# Patient Record
Sex: MALE | Race: WHITE | NOT HISPANIC OR LATINO | Employment: FULL TIME
[De-identification: names, ages, dates, MRNs, and addresses within clinical notes are randomized per-mention and may not be internally consistent; named-entity substitution may affect disease eponyms.]

---

## 2017-02-02 ENCOUNTER — RECORDS - HEALTHEAST (OUTPATIENT)
Dept: ADMINISTRATIVE | Facility: OTHER | Age: 32
End: 2017-02-02

## 2017-03-02 ENCOUNTER — RECORDS - HEALTHEAST (OUTPATIENT)
Dept: ADMINISTRATIVE | Facility: OTHER | Age: 32
End: 2017-03-02

## 2017-04-20 ENCOUNTER — RECORDS - HEALTHEAST (OUTPATIENT)
Dept: ADMINISTRATIVE | Facility: OTHER | Age: 32
End: 2017-04-20

## 2017-04-25 ENCOUNTER — RECORDS - HEALTHEAST (OUTPATIENT)
Dept: ADMINISTRATIVE | Facility: OTHER | Age: 32
End: 2017-04-25

## 2017-05-05 ENCOUNTER — RECORDS - HEALTHEAST (OUTPATIENT)
Dept: ADMINISTRATIVE | Facility: OTHER | Age: 32
End: 2017-05-05

## 2017-05-13 ENCOUNTER — RECORDS - HEALTHEAST (OUTPATIENT)
Dept: ADMINISTRATIVE | Facility: OTHER | Age: 32
End: 2017-05-13

## 2017-05-30 ENCOUNTER — RECORDS - HEALTHEAST (OUTPATIENT)
Dept: ADMINISTRATIVE | Facility: OTHER | Age: 32
End: 2017-05-30

## 2017-06-13 ENCOUNTER — RECORDS - HEALTHEAST (OUTPATIENT)
Dept: ADMINISTRATIVE | Facility: OTHER | Age: 32
End: 2017-06-13

## 2017-10-16 ENCOUNTER — RECORDS - HEALTHEAST (OUTPATIENT)
Dept: ADMINISTRATIVE | Facility: OTHER | Age: 32
End: 2017-10-16

## 2017-12-21 ENCOUNTER — RECORDS - HEALTHEAST (OUTPATIENT)
Dept: ADMINISTRATIVE | Facility: OTHER | Age: 32
End: 2017-12-21

## 2018-10-18 ENCOUNTER — RECORDS - HEALTHEAST (OUTPATIENT)
Dept: ADMINISTRATIVE | Facility: OTHER | Age: 33
End: 2018-10-18

## 2018-10-19 ENCOUNTER — OFFICE VISIT - HEALTHEAST (OUTPATIENT)
Dept: FAMILY MEDICINE | Facility: CLINIC | Age: 33
End: 2018-10-19

## 2018-10-19 ENCOUNTER — COMMUNICATION - HEALTHEAST (OUTPATIENT)
Dept: TELEHEALTH | Facility: CLINIC | Age: 33
End: 2018-10-19

## 2018-10-19 DIAGNOSIS — Z00.00 WELL ADULT EXAM: ICD-10-CM

## 2018-10-19 DIAGNOSIS — K21.9 GERD (GASTROESOPHAGEAL REFLUX DISEASE): ICD-10-CM

## 2018-10-19 DIAGNOSIS — F41.8 DEPRESSION WITH ANXIETY: ICD-10-CM

## 2018-10-19 LAB — TSH SERPL DL<=0.005 MIU/L-ACNC: 1.57 UIU/ML (ref 0.3–5)

## 2018-10-19 RX ORDER — OMEGA-3S/DHA/EPA/FISH OIL/D3 300MG-1000
400 CAPSULE ORAL DAILY
Status: SHIPPED | COMMUNITY
Start: 2018-10-19

## 2018-10-19 RX ORDER — OMEPRAZOLE 10 MG/1
20 CAPSULE, DELAYED RELEASE ORAL
Qty: 60 CAPSULE | Refills: 11 | Status: SHIPPED | OUTPATIENT
Start: 2018-10-19

## 2018-10-19 ASSESSMENT — MIFFLIN-ST. JEOR: SCORE: 1627.68

## 2018-10-22 ENCOUNTER — COMMUNICATION - HEALTHEAST (OUTPATIENT)
Dept: FAMILY MEDICINE | Facility: CLINIC | Age: 33
End: 2018-10-22

## 2021-06-02 VITALS — BODY MASS INDEX: 23.79 KG/M2 | HEIGHT: 68 IN | WEIGHT: 157 LBS

## 2021-06-16 PROBLEM — A04.72 C. DIFFICILE COLITIS: Status: ACTIVE | Noted: 2018-10-23

## 2021-06-16 PROBLEM — F41.8 DEPRESSION WITH ANXIETY: Status: ACTIVE | Noted: 2018-10-19

## 2021-06-16 PROBLEM — K21.9 GERD (GASTROESOPHAGEAL REFLUX DISEASE): Status: ACTIVE | Noted: 2018-10-19

## 2021-06-16 PROBLEM — K58.9 IBS (IRRITABLE BOWEL SYNDROME): Status: ACTIVE | Noted: 2018-10-23

## 2021-06-16 PROBLEM — R76.8 HELICOBACTER PYLORI AB+: Status: ACTIVE | Noted: 2018-10-23

## 2021-06-21 NOTE — PROGRESS NOTES
SUBJECTIVE:  Stephane Holden is a 33 y.o. male who is here for an annual physical.  he is seen as a new patient in our office and for physical.  He has a history of GERD and takes omeprazole, 20 mg daily, has done this for a year and a half daily and prior to that took it occasionally.  In 2014 he had an endoscopy and colonoscopy because of epigastric pain, acid reflux, irritable bowel symptoms.  Some inflammation was seen but his H. pylori and biopsies were negative and an endoscopy and colonoscopy.  He was advised to stay on omeprazole.  He tried Zantac and this made his stomach cramp.  Been tested for food allergies and is on a strict diet, eats plenty of fruits and vegetables and drinks plenty of water, maintained his weight well without weight loss.  Is on a gluten-free diet for gluten sensitivity.  He notes some wrist pain since his last job where he was on the computer continually and did phone answer.  He is currently off work, moved to this area from [place].  Lives with his fiancée.  Has had a history of anxiety and depression, denies suicidal or homicidal ideations, states he usually can control his symptoms well.  He would like to seek some cognitive behavioral therapy to help with situational causes for this and to deal with family of origin issues.  ROS: Patient denies fever, chills, sweats, fainting, fatigue, weight change, dizziness, sleep problems, chest pain, palpitations, shortness of breath, wheezing, cough,  sore throat, changes in hearing, ear pain,tinnitus,  disphagia, sore throat, changes in vision, eye pain eye redness, acid reflux, nausea, vomiting, constipation, black or bloody stools,  Dysuria, frequency, urinary incontinence, nocturia, hematuria, back pain, bone pain, muscle cramps,edema, weakness, numbness, tingling of extremities, rash, itching, skin changes, swollen lymph nodes, thirst, increased urination, breast lumps, breast pain, nipple discharge, memory difficulties, mood  "swings, (female)vaginal discharge, dyspareunia, menorrhagia, pelvic pain, sexual dysfunction,   (male) testicular lumps, erectile dysfunction.  No past medical history on file.  No current outpatient prescriptions on file prior to visit.     No current facility-administered medications on file prior to visit.      Social History     Social History     Marital status: Single     Spouse name: N/A     Number of children: N/A     Years of education: N/A     Occupational History     Not on file.     Social History Main Topics     Smoking status: Never Smoker     Smokeless tobacco: Never Used     Alcohol use Yes     Drug use: No     Sexual activity: Not on file     Other Topics Concern     Not on file     Social History Narrative     No narrative on file     Social History     Social History Narrative     No narrative on file     No past surgical history on file.  No family history on file.  /70  Pulse 60  Ht 5' 7.75\" (1.721 m)  Wt 157 lb (71.2 kg)  BMI 24.05 kg/m2  Objective:  General: alert, oriented and in no distress.  HEENT: Sclera white, PERRLA, EOMFI nares patent, MMM with no oral erythema or edema, TYMPANIC MEMBRANE's pearly bilaterally.EACs without erythema or edema.  Heart: Heart has a regular rate and rhythm, no murmurs, clicks or rubs.  Neck: neck supple with no adenopathy, no carotid bruits, thyroid is non-nodular and without organomegaly.  Lungs: Respirations are not labored. Equal chest rise, clear to auscultation, without rales, rhonchi or wheezes.  Abdomen: BS in 4 quadrants, no tenderness to light or deep palpation, liver and spleen are not palpably enlarged and there are no masses noted.   Vascular: 2/4 all 4 extremities; no edema, erythema or tenderness in lower extremities.  Neuro: CN 2-12 intact, DTR 2/4 all extremities, M/S equal all extremities.  Back: Normal curvatures, no tenderness of spinous processes or musculature with palpation of cervical, thoracic or lumbar back. No CVA " tenderness bilaterally.  Nails: fingernails and toenails are pink, without thickened or yellow keratin.  Mood: mildly anxious, good eye contact, affect full, no pressured speech, no suicidal ideations. Worries over health concerns.  Assessment and Plan:  1. Well adult exam     2. GERD (gastroesophageal reflux disease)     3. Depression with anxiety  Ambulatory referral to Psychology    Thyroid [place]     Advised health diet, 6-8 fruit and vegetables daily and discussed juicing in am with protein powder for breakfast.  Advised regular exercise, discussed regular aerobic exercise and strength training.  Water: 6-8 glasses daily.      --   Esthela Matthews, MS, PA-C

## 2021-06-26 ENCOUNTER — HEALTH MAINTENANCE LETTER (OUTPATIENT)
Age: 36
End: 2021-06-26

## 2021-10-16 ENCOUNTER — HEALTH MAINTENANCE LETTER (OUTPATIENT)
Age: 36
End: 2021-10-16

## 2021-12-17 ENCOUNTER — IMMUNIZATION (OUTPATIENT)
Dept: NURSING | Facility: CLINIC | Age: 36
End: 2021-12-17
Payer: COMMERCIAL

## 2021-12-17 PROCEDURE — 91306 COVID-19,PF,MODERNA (18+ YRS BOOSTER .25ML): CPT

## 2021-12-17 PROCEDURE — 0064A COVID-19,PF,MODERNA (18+ YRS BOOSTER .25ML): CPT

## 2022-07-17 ENCOUNTER — HEALTH MAINTENANCE LETTER (OUTPATIENT)
Age: 37
End: 2022-07-17

## 2022-09-25 ENCOUNTER — HEALTH MAINTENANCE LETTER (OUTPATIENT)
Age: 37
End: 2022-09-25

## 2022-10-18 ENCOUNTER — IMMUNIZATION (OUTPATIENT)
Dept: FAMILY MEDICINE | Facility: CLINIC | Age: 37
End: 2022-10-18
Payer: COMMERCIAL

## 2022-10-18 DIAGNOSIS — Z23 NEED FOR PROPHYLACTIC VACCINATION AND INOCULATION AGAINST INFLUENZA: Primary | ICD-10-CM

## 2022-10-18 PROCEDURE — 90686 IIV4 VACC NO PRSV 0.5 ML IM: CPT

## 2022-10-18 PROCEDURE — 99207 PR NO CHARGE NURSE ONLY: CPT

## 2022-10-18 PROCEDURE — 90471 IMMUNIZATION ADMIN: CPT

## 2023-05-13 ENCOUNTER — HEALTH MAINTENANCE LETTER (OUTPATIENT)
Age: 38
End: 2023-05-13

## 2024-07-20 ENCOUNTER — HEALTH MAINTENANCE LETTER (OUTPATIENT)
Age: 39
End: 2024-07-20

## 2025-08-09 ENCOUNTER — HEALTH MAINTENANCE LETTER (OUTPATIENT)
Age: 40
End: 2025-08-09